# Patient Record
Sex: MALE | Race: WHITE | NOT HISPANIC OR LATINO | Employment: FULL TIME | URBAN - METROPOLITAN AREA
[De-identification: names, ages, dates, MRNs, and addresses within clinical notes are randomized per-mention and may not be internally consistent; named-entity substitution may affect disease eponyms.]

---

## 2017-02-10 ENCOUNTER — PROCEDURE VISIT (OUTPATIENT)
Dept: NEUROLOGY | Facility: CLINIC | Age: 52
End: 2017-02-10
Payer: OTHER GOVERNMENT

## 2017-02-10 ENCOUNTER — OFFICE VISIT (OUTPATIENT)
Dept: NEUROLOGY | Facility: CLINIC | Age: 52
End: 2017-02-10
Payer: OTHER GOVERNMENT

## 2017-02-10 VITALS
BODY MASS INDEX: 33.34 KG/M2 | HEART RATE: 62 BPM | DIASTOLIC BLOOD PRESSURE: 91 MMHG | HEIGHT: 71 IN | WEIGHT: 238.13 LBS | SYSTOLIC BLOOD PRESSURE: 132 MMHG

## 2017-02-10 DIAGNOSIS — G56.23 ULNAR NEUROPATHY OF BOTH UPPER EXTREMITIES: Primary | ICD-10-CM

## 2017-02-10 DIAGNOSIS — R20.0 NUMBNESS: ICD-10-CM

## 2017-02-10 PROCEDURE — 95911 NRV CNDJ TEST 9-10 STUDIES: CPT | Mod: 26,S$PBB,,

## 2017-02-10 PROCEDURE — 95886 MUSC TEST DONE W/N TEST COMP: CPT | Mod: PBBFAC

## 2017-02-10 PROCEDURE — 99213 OFFICE O/P EST LOW 20 MIN: CPT | Mod: S$PBB,,, | Performed by: PSYCHIATRY & NEUROLOGY

## 2017-02-10 PROCEDURE — 95911 NRV CNDJ TEST 9-10 STUDIES: CPT | Mod: PBBFAC

## 2017-02-10 PROCEDURE — 95886 MUSC TEST DONE W/N TEST COMP: CPT | Mod: 26,S$PBB,,

## 2017-02-10 PROCEDURE — 99213 OFFICE O/P EST LOW 20 MIN: CPT | Mod: PBBFAC | Performed by: PSYCHIATRY & NEUROLOGY

## 2017-02-10 PROCEDURE — 99999 PR PBB SHADOW E&M-EST. PATIENT-LVL III: CPT | Mod: PBBFAC,,, | Performed by: PSYCHIATRY & NEUROLOGY

## 2017-02-10 NOTE — MR AVS SNAPSHOT
[facility]  [address]  [facility] [address]  Phone: [phone]  Fax: [phone]                  Jordan Jones   2/10/2017 12:20 PM   Office Visit    Description:  Male : 1965   Provider:  Kel Huerta MD   Department:  [facility]           Reason for Visit     Follow-up           Diagnoses this Visit        Comments    Ulnar neuropathy of both upper extremities    -  Primary            To Do List           Goals (5 Years of Data)     None      [facility]     [facility] -  Assistance  Registered nurses in the [facility] provide clinical advisement, health education, appointment booking, and other advisory services.  Call for this free service at [phone].             Medications           Message regarding Medications     Verify the changes and/or additions to your medication regime listed below are the same as discussed with your clinician today.  If any of these changes or additions are incorrect, please notify your healthcare provider.        STOP taking these medications     cetirizine (ZYRTEC) 10 MG tablet Take 10 mg by mouth once daily.           Verify that the below list of medications is an accurate representation of the medications you are currently taking.  If none reported, the list may be blank. If incorrect, please contact your healthcare provider. Carry this list with you in case of emergency.           Current Medications     citalopram (CELEXA) 20 MG tablet Take 20 mg by mouth once daily.    cyclobenzaprine (FLEXERIL) 10 MG tablet Take 10 mg by mouth 3 (three) times daily as needed for Muscle spasms.    propranolol (INDERAL) 20 MG tablet Take 20 mg by mouth 3 (three) times daily.    ranitidine (ZANTAC) 150 MG tablet Take 1 tablet (150 mg total) by mouth 2 (two) times daily.    sildenafil (VIAGRA) 100 MG tablet Take 100 mg by mouth daily as needed for Erectile Dysfunction.    sumatriptan 5  "mg/actuation Spry 10 mg by Nasal route daily as needed (may repeat dose once after 2 hours, not to exceed 40 mg daily).           Clinical Reference Information           Your Vitals Were     BP Pulse Height Weight BMI    132/91 62 5' 11" (1.803 m) 108 kg (238 lb 1.6 oz) 33.21 kg/m2      Blood Pressure          Most Recent Value    BP  (!)  132/91      Allergies as of 2/10/2017     No Known Allergies      Immunizations Administered on Date of Encounter - 2/10/2017     None      Instructions    Start supplementation with magnesium 400mg daily and/or vitamin B2 (riboflavin) 400mg daily for migraine.    Attempt to wear elbow braces to keep pressure of elbows.       Language Assistance Services     ATTENTION: Language assistance services are available, free of charge. Please call [phone].      ATENCIÓN: Si laronla zeny, tiene a agrcia disposición servicios gratuitos de asistencia lingüística. Llame al [phone].     CHÚ Ý: N?u b?n nói Ti?ng Vi?t, có các d?ch v? h? tr? ngôn ng? mi?n phí dành cho b?n. G?i s? [phone].         [facility] complies with applicable Federal civil rights laws and does not discriminate on the basis of race, color, national origin, age, disability, or sex.        "

## 2017-02-10 NOTE — LETTER
February 10, 2017      Barbara L. Morgan Schoen, FNP  [address] [facility] [address]           [facility]  [address]  [place] [address]  Phone: [phone]  Fax: [phone]          Patient: Jordan Jones   MR Number: [id number]   YOB: 1965   Date of Visit: 2/10/2017       Dear Barbara L. Morgan Schoen:    Thank you for referring Jordan Jones to me for evaluation. Attached you will find relevant portions of my assessment and plan of care.    If you have questions, please do not hesitate to call me. I look forward to following Jordan Jones along with you.    Sincerely,    Kel Huerta MD    Enclosure  CC:  No Recipients    If you would like to receive this communication electronically, please contact [email or web address] or [phone] to request more information on [facility] access.    For providers and/or their staff who would like to refer a patient to [facility], please contact us through our one-stop-shop provider referral line, [facility], at [phone].    If you feel you have received this communication in error or would no longer like to receive these types of communications, please e-mail [email or web address]

## 2017-02-10 NOTE — PROGRESS NOTES
[facility] - NEUROLOGY  [facility] [place]    Date: February 10, 2017   Patient Name: Jordan Jones   MRN: [id number]   PCP: Barbara L Morgan Schoen  Referring Provider: Morgan Schoen, Barbara *    Assessment:      This is Jordan Jones, 52 y.o. male with bilateral ulnar entrapment and migraine headaches.  Entrapment neuropathies are currently mild, will refrain from surgical referral at this time.     Plan:      -  Advised trial of elbow braces to avoid pressure on ulnar nerve  -  Mg and B2 supplementation for headache         Kel Huerta MD  [facility]   Department of Neurology    Subjective:   Continued waxing/waning numbness in arms worse with certain postures such as driving, no weakness.  Continued frequent migraines, taking elavil 25mg qhs with inability to tolerate higher dose - headaches more frequent in [place].      PAST MEDICAL HISTORY:  Past Medical History   Diagnosis Date    Headache     Hearing loss     Hypertension        PAST SURGICAL HISTORY:  No past surgical history on file.    CURRENT MEDS:  Current Outpatient Prescriptions   Medication Sig Dispense Refill    citalopram (CELEXA) 20 MG tablet Take 20 mg by mouth once daily.      cyclobenzaprine (FLEXERIL) 10 MG tablet Take 10 mg by mouth 3 (three) times daily as needed for Muscle spasms.      propranolol (INDERAL) 20 MG tablet Take 20 mg by mouth 3 (three) times daily.      ranitidine (ZANTAC) 150 MG tablet Take 1 tablet (150 mg total) by mouth 2 (two) times daily. 60 tablet 11    sildenafil (VIAGRA) 100 MG tablet Take 100 mg by mouth daily as needed for Erectile Dysfunction.      sumatriptan 5 mg/actuation Spry 10 mg by Nasal route daily as needed (may repeat dose once after 2 hours, not to exceed 40 mg daily). 3 each 0     No current facility-administered medications for this visit.        ALLERGIES:  Review of patient's allergies indicates:  No Known Allergies    FAMILY  "HISTORY:  Family History   Problem Relation Age of Onset    Diabetes Mother     Fainting Mother     Hyperlipidemia Mother     Obesity Mother     Hyperlipidemia Father     Stroke Father        SOCIAL HISTORY:  Social History   Substance Use Topics    Smoking status: Former Smoker     Quit date: 1/29/1989    Smokeless tobacco: Current User    Alcohol use 1.8 oz/week     0 Standard drinks or equivalent, 3 Cans of beer per week       Review of Systems:  12 review of systems is negative except for the symptoms mentioned in HPI.        Objective:     Vitals:    02/10/17 1220   BP: (!) 132/91   Pulse: 62   Weight: 108 kg (238 lb 1.6 oz)   Height: 5' 11" (1.803 m)       General: NAD, well nourished   Eyes: no tearing, discharge, no erythema   ENT: moist mucous membranes of the oral cavity, nares patent    Neck: Supple, full range of motion  Cardiovascular: Warm and well perfused, pulses equal and symmetrical  Lungs: Normal work of breathing, normal chest wall excursions  Skin: No rash, lesions, or breakdown on exposed skin  Psychiatry: Mood and affect are appropriate   Abdomen: soft, non tender, non distended  Extremeties: No cyanosis, clubbing or edema.    Neurological   MENTAL STATUS: Alert and oriented to person, place, and time. Attention and concentration within normal limits. Speech without dysarthria, able to name and repeat without difficulty. Recent and remote memory within normal limits   CRANIAL NERVES: Visual fields intact. PERRL. EOMI. Facial sensation intact. Face symmetrical. Hearing grossly intact. Full shoulder shrug bilaterally. Tongue protrudes midline   SENSORY: Sensation is decreased to light touch in right ulnar distribution.    MOTOR: Normal bulk and tone. No pronator drift.  5/5 deltoid, biceps, triceps, interosseous, hand  bilaterally. 5/5 iliopsoas, knee extension/flexion, foot dorsi/plantarflexion bilaterally.    REFLEXES: Symmetric and 2+ throughout. Toes down going bilaterally. "   CEREBELLAR/COORDINATION/GAIT: Gait steady with normal arm swing and stride length.

## 2017-02-10 NOTE — PROCEDURES
Procedures     See the copy of this report that has been scanned into patient's Epic Chart.     PAIGE GALLEGO III, MD

## 2017-02-10 NOTE — PATIENT INSTRUCTIONS
Start supplementation with magnesium 400mg daily and/or vitamin B2 (riboflavin) 400mg daily for migraine.    Attempt to wear elbow braces to keep pressure of elbows.